# Patient Record
Sex: FEMALE | Race: WHITE | URBAN - METROPOLITAN AREA
[De-identification: names, ages, dates, MRNs, and addresses within clinical notes are randomized per-mention and may not be internally consistent; named-entity substitution may affect disease eponyms.]

---

## 2023-06-07 ENCOUNTER — OFFICE VISIT (OUTPATIENT)
Dept: URBAN - METROPOLITAN AREA CLINIC 45 | Facility: CLINIC | Age: 63
End: 2023-06-07
Payer: COMMERCIAL

## 2023-06-07 DIAGNOSIS — H25.13 AGE-RELATED NUCLEAR CATARACT, BILATERAL: ICD-10-CM

## 2023-06-07 DIAGNOSIS — H00.014 HORDEOLUM OF LEFT UPPER EYELID: Primary | ICD-10-CM

## 2023-06-07 DIAGNOSIS — H40.033 ANATOMICAL NARROW ANGLE, BILATERAL: ICD-10-CM

## 2023-06-07 PROCEDURE — 99203 OFFICE O/P NEW LOW 30 MIN: CPT | Performed by: OPTOMETRIST

## 2023-06-07 RX ORDER — NEOMYCIN SULFATE, POLYMYXIN B SULFATE AND DEXAMETHASONE 3.5; 10000; 1 MG/G; [USP'U]/G; MG/G
OINTMENT OPHTHALMIC
Qty: 1 | Refills: 0 | Status: INACTIVE
Start: 2023-06-07 | End: 2023-07-06

## 2023-06-07 ASSESSMENT — INTRAOCULAR PRESSURE
OS: 18
OD: 20

## 2023-06-07 NOTE — IMPRESSION/PLAN
Impression: Anatomical narrow angle, bilateral: H40.033.  Plan: No treatment, will monitor at next visit with GONIO

## 2023-06-07 NOTE — IMPRESSION/PLAN
Impression: Hordeolum of left upper eyelid: H00.014. Plan: Patient instructed to apply warm compresses. Start Maxitrol justyn apply 1/4 inch ribbon to left upper lid QID.

## 2023-06-21 ENCOUNTER — OFFICE VISIT (OUTPATIENT)
Dept: URBAN - METROPOLITAN AREA CLINIC 45 | Facility: CLINIC | Age: 63
End: 2023-06-21
Payer: COMMERCIAL

## 2023-06-21 DIAGNOSIS — H00.014 HORDEOLUM OF LEFT UPPER EYELID: Primary | ICD-10-CM

## 2023-06-21 DIAGNOSIS — H40.033 ANATOMICAL NARROW ANGLE, BILATERAL: ICD-10-CM

## 2023-06-21 PROCEDURE — 99213 OFFICE O/P EST LOW 20 MIN: CPT | Performed by: OPTOMETRIST

## 2023-06-21 ASSESSMENT — INTRAOCULAR PRESSURE: OS: 22

## 2023-06-21 NOTE — IMPRESSION/PLAN
Impression: Anatomical narrow angle, bilateral: H40.033. Plan: pt didn't want any glaucoma testing. Educ importance of IOP check.  States she doesn't want any new diagnosis, only wants stye addressed

## 2023-06-21 NOTE — IMPRESSION/PLAN
Impression: Hordeolum of left upper eyelid: H00.014. Plan: Finish Maxitrol justyn qid to left upper lid lesion. Lubricate. iop high os, steroid side effect explained. rtc if s/s worsen or not any better.